# Patient Record
Sex: FEMALE | Race: BLACK OR AFRICAN AMERICAN | NOT HISPANIC OR LATINO | Employment: UNEMPLOYED | URBAN - METROPOLITAN AREA
[De-identification: names, ages, dates, MRNs, and addresses within clinical notes are randomized per-mention and may not be internally consistent; named-entity substitution may affect disease eponyms.]

---

## 2018-11-21 ENCOUNTER — HOSPITAL ENCOUNTER (EMERGENCY)
Facility: HOSPITAL | Age: 43
Discharge: HOME | End: 2018-11-21
Attending: EMERGENCY MEDICINE
Payer: MEDICAID

## 2018-11-21 VITALS
DIASTOLIC BLOOD PRESSURE: 72 MMHG | HEART RATE: 98 BPM | BODY MASS INDEX: 28.22 KG/M2 | RESPIRATION RATE: 19 BRPM | SYSTOLIC BLOOD PRESSURE: 157 MMHG | OXYGEN SATURATION: 99 % | WEIGHT: 140 LBS | HEIGHT: 59 IN | TEMPERATURE: 99 F

## 2018-11-21 DIAGNOSIS — L30.9 ECZEMA, UNSPECIFIED TYPE: Primary | ICD-10-CM

## 2018-11-21 PROCEDURE — 99283 EMERGENCY DEPT VISIT LOW MDM: CPT

## 2018-11-21 PROCEDURE — 99283 EMERGENCY DEPT VISIT LOW MDM: CPT | Mod: ,,, | Performed by: EMERGENCY MEDICINE

## 2018-11-21 RX ORDER — HYDROCORTISONE 1 %
CREAM (GRAM) TOPICAL
Qty: 30 G | Refills: 0 | Status: SHIPPED | OUTPATIENT
Start: 2018-11-21

## 2018-11-22 NOTE — ED TRIAGE NOTES
Pt came in with son as  due to being deaf with complaints of rashes to hand, chest and neck that itches and pt reports that this been going on for a year. Pt denies cp, SOB. Unable to get full health history, son says grandmother knows more about pts health but is not here.

## 2018-11-22 NOTE — DISCHARGE INSTRUCTIONS
Benadryl as needed for itching.  Workman's hands to hands.  Please avoid constant handwashing and wet hands.  Please follow up with Dermatology

## 2018-11-22 NOTE — ED PROVIDER NOTES
Encounter Date: 11/21/2018    SCRIBE #1 NOTE: I, Roberto Carlos Dorcas, am scribing for, and in the presence of,  Ying Gonzales MD. I have scribed the following portions of the note -   SCRIBE #2 NOTE: I, Sammy Perez, am scribing for, and in the presence of, Ying Gonzales MD.     History     Chief Complaint   Patient presents with    Rash     rash bilateral both hands and head for 2 months. pt states it painful 2 /10.        43 y.o. patient who presents to the ED for a rash which onset 1 year ago. Rash is localized to her hands, neck, and scalp. Pt gave herself a perm 2 days ago. No associated sxs. Pt denies any fever, N/V/D, CP, SOB, chills, and all other sxs at this time.she has no rash on her head no new products.  No constitutional symptoms.  No travel or sick contacts.  She has rash on bilateral dorsal side of hands and also at the nape of the neck.  It is pruritic.      The history is provided by the patient.     Review of patient's allergies indicates:  No Known Allergies  History reviewed. No pertinent past medical history.  History reviewed. No pertinent surgical history.  No family history on file.  Social History     Tobacco Use    Smoking status: Current Every Day Smoker     Packs/day: 1.00     Types: Cigarettes   Substance Use Topics    Alcohol use: Yes     Comment: unsure of amount but not much.    Drug use: Not on file     Review of Systems   Constitutional: Negative.  Negative for chills and fever.   HENT: Negative.  Negative for sore throat.    Eyes: Negative.    Respiratory: Negative.  Negative for shortness of breath.    Cardiovascular: Negative.  Negative for chest pain.   Gastrointestinal: Negative.  Negative for diarrhea, nausea and vomiting.   Genitourinary: Negative.  Negative for dysuria.   Musculoskeletal: Negative.  Negative for back pain.   Skin: Positive for rash.   Neurological: Negative.  Negative for weakness.   Hematological: Negative.  Does not bruise/bleed easily.   All other  systems reviewed and are negative.      Physical Exam     Initial Vitals [11/21/18 2029]   BP Pulse Resp Temp SpO2   (!) 157/72 98 19 98.8 °F (37.1 °C) 99 %      MAP       --         Physical Exam    Nursing note and vitals reviewed.  Constitutional: She appears well-developed and well-nourished. She is not diaphoretic. No distress.   HENT:   Head: Normocephalic and atraumatic.   Mouth/Throat: Oropharynx is clear and moist.   Eyes: Conjunctivae and EOM are normal. Pupils are equal, round, and reactive to light.   Neck: Normal range of motion. Neck supple. No JVD present.   Patient has noninfected exam is appearing rash at the nape of the neck.  No rash on the head.  No kerion.   Cardiovascular: Normal rate.   Pulmonary/Chest: Breath sounds normal. No respiratory distress. She has no wheezes. She has no rhonchi. She has no rales.   Abdominal: She exhibits no distension and no mass. There is no tenderness. There is no rebound and no guarding.   Musculoskeletal: Normal range of motion. She exhibits no edema or tenderness.   Neurological: She is alert. She has normal strength. GCS score is 15. GCS eye subscore is 4. GCS verbal subscore is 5. GCS motor subscore is 6.   Skin: Skin is warm and dry. No rash noted.   She has eczematous appearing noninfected rash on the dorsum of the hands at the MCP of the ring and pinky fingers bilaterally. No superinfection.  Does not appear to be fungal.  Motor strength sensation intact bilateral hands.  With normal pulses.   Psychiatric: She has a normal mood and affect.         ED Course   Procedures  Labs Reviewed - No data to display       Imaging Results    None                     Scribe Attestation:   Scribe #1: I performed the above scribed service and the documentation accurately describes the services I performed. I attest to the accuracy of the note.    Attending Attestation:             Attending ED Notes:   Patient likely has eczematous type rash. Normal palms and soles no  constitutional symptoms.  We will give hydrocortisone cream and have this patient follow up outpatient with Dermatology.  Patient understands she should follow up with specialist.             Clinical Impression:  Rash   There were no encounter diagnoses.                             Ying Gonzales MD  11/21/18 6902